# Patient Record
Sex: MALE | Race: WHITE | Employment: FULL TIME | URBAN - METROPOLITAN AREA
[De-identification: names, ages, dates, MRNs, and addresses within clinical notes are randomized per-mention and may not be internally consistent; named-entity substitution may affect disease eponyms.]

---

## 2021-02-22 ENCOUNTER — OFFICE VISIT (OUTPATIENT)
Dept: URGENT CARE | Facility: URGENT CARE | Age: 36
End: 2021-02-22
Payer: OTHER MISCELLANEOUS

## 2021-02-22 VITALS
DIASTOLIC BLOOD PRESSURE: 69 MMHG | OXYGEN SATURATION: 98 % | SYSTOLIC BLOOD PRESSURE: 126 MMHG | HEART RATE: 60 BPM | WEIGHT: 153 LBS | TEMPERATURE: 98.6 F

## 2021-02-22 DIAGNOSIS — S41.112A LACERATION OF LEFT UPPER EXTREMITY, INITIAL ENCOUNTER: Primary | ICD-10-CM

## 2021-02-22 DIAGNOSIS — T07.XXXA ABRASIONS OF MULTIPLE SITES: ICD-10-CM

## 2021-02-22 PROCEDURE — 12001 RPR S/N/AX/GEN/TRNK 2.5CM/<: CPT | Mod: 59 | Performed by: PHYSICIAN ASSISTANT

## 2021-02-22 PROCEDURE — 12032 INTMD RPR S/A/T/EXT 2.6-7.5: CPT | Performed by: PHYSICIAN ASSISTANT

## 2021-02-22 PROCEDURE — 99203 OFFICE O/P NEW LOW 30 MIN: CPT | Mod: 25 | Performed by: PHYSICIAN ASSISTANT

## 2021-02-22 RX ORDER — CEPHALEXIN 500 MG/1
500 CAPSULE ORAL 3 TIMES DAILY
Qty: 21 CAPSULE | Refills: 0 | Status: SHIPPED | OUTPATIENT
Start: 2021-02-22 | End: 2021-03-01

## 2021-02-23 NOTE — PATIENT INSTRUCTIONS
Patient Education     Laceration of an Arm or Leg: Stitches, Staples, or Tape   A laceration is a cut through the skin. If it's deep or it's gaping open, it may require stitches or staples to close so it can heal. Minor cuts may be treated with surgical tape closures, or skin glue.   X-rays may be done if something may have entered the skin through the cut. You may also need a tetanus shot if you are not up to date on this vaccine.   Home care    Follow the healthcare provider s instructions on how to care for the cut.    Wash your hands with soap and clean, running water before and after caring for your wound. This is to help prevent infection.    Keep the wound clean and dry. If a bandage was applied and it becomes wet or dirty, replace it. Otherwise, leave it in place for the first 24 hours, then change it once a day or as directed.    If stitches or staples were used, clean the wound daily:  ? After removing the bandage, wash the area with soap and water. Use a wet cotton swab to loosen and remove any blood or crust that forms.  ? After cleaning, keep the wound clean and dry. Talk with your healthcare provider before putting any antibiotic ointment on the wound. Reapply the bandage.    Remove the bandage to shower as usual after the first 24 hours, but don't soak the area in water (no swimming) until the stitches or staples are removed.    If surgical tape closures were used, keep the area clean and dry. If it becomes wet, blot it dry with a towel. Let the surgical tape fall off on its own.    Follow the healthcare provider's instructions for any medicines prescribed.  ? The provider may prescribe an antibiotic cream or ointment to prevent infection. He or she may also prescribe an antibiotic pill. Don't stop taking this medicine until you have finished it all or the provider tells you to stop.  ? The provider may also prescribe medicine for pain. Follow the instructions exactly for how to take these  medicines.    Don't do activities that may reopen your wound.    Follow-up care  Follow up with your healthcare provider, or as advised. Most skin wounds heal within 10 days. But an infection may sometimes occur even with proper treatment. Check the wound daily for the signs of infection listed below. Stitches and staples should be removed within 7 to14 days. If surgical tape closures were used, you may remove them after 10 days if they have not fallen off by then.    When to seek medical advice  Call your healthcare provider right away if any of these occur:    Wound bleeding not controlled by direct pressure    Signs of infection, including increasing pain in the wound, increasing wound redness or swelling, or pus or bad odor coming from the wound    Chills, fever of 100.4 F (38 C) or higher, or as directed by your healthcare provider    Stitches or staples coming apart or falling out or surgical tape falling off before 7 days    Wound edges reopening    Color changes in the wound    Numbness around the wound     Decreased movement around the injured area  [employer] last reviewed this educational content on 6/1/2020 2000-2020 [employer]. [address] [place] [address], [place] [address]. All rights reserved. This information is not intended as a substitute for professional medical care. Always follow your healthcare professional's instructions.

## 2021-02-23 NOTE — PROGRESS NOTES
Chief Complaint   Patient presents with     Urgent Care     Laceration     right forearm, left hand and upper arm       ASSESSMENT/PLAN:  Bhavesh was seen today for urgent care and laceration.    Diagnoses and all orders for this visit:    Laceration of left upper extremity, initial encounter  -     cephALEXin (KEFLEX) 500 MG capsule; Take 1 capsule (500 mg) by mouth 3 times daily for 7 days    Abrasions of multiple sites      Multiple abrasions and lacerations that were repaired today.  Procedure noticed well.  No evidence of concussion.  Discussed in detail wound care and return in 10 days for suture removal.  Will start on prophylactic antibiotics given proximity to muscle tissue and due to the object that caused his injury.  Does not know when his last tetanus shot the left before we did administer the vaccine.  Will call patient to come back to receive that      2 wounds closed today.   5 cm stellite wound over L bicep: Wound cleansed with cleanse soak.  Anesthesia: 5 cc of 2% lidocaine with epinephrine.  Injection site cleaned with iodine swab and alcohol.  Was for anesthesia was obtained the wound was further cleansed and explored for foreign body which there was none.  Thoroughly assessed for muscle involvement and weakness which there was none of.  4 oh Ethilon was used.  Horizontal mattress suture was used to bring tissue edges and close stellate area over the middle aspect.  Then 7 simple interrupted sutures were used to approximate wound edges with good aesthetic outcome.  Steri-Strips were placed over wound as well.  Care was taken to examine muscle strength throughout the procedure along with evidence for suturing underlying muscle tissue for which there was none.    1 cm full-thickness laceration of proximal forearm was anesthetized similarly as above and closed with 1 simple interrupted suture of 4-0 Ethilon and Steri-Strips placed over it    The rest of the abrasions were cleaned and bandaged    60  minutes spent on the date of the encounter doing chart review, history and exam, documentation and further activities as noted above    The plan of care was discussed with the patient. They understand and agree with the course of treatment prescribed. A printed summary was given including instructions and medications.    SUBJECTIVE:  Bhavesh is a 35 year old male who presents to urgent care with multiple abrasions over his extremities.  Patient was taking light bulb shattered and a glass succulent container fell on him and also shattered.  It went up of the head and cut multiple sites on his extremities.  Patient denies any loss of consciousness, nausea, vomiting.     ROS: Pertinent ROS neg other than the symptoms noted above in the HPI.     OBJECTIVE:  /69 (BP Location: Right arm, Patient Position: Sitting, Cuff Size: Adult Regular)   Pulse 60   Temp 98.6  F (37  C) (Oral)   Wt 69.4 kg (153 lb)   SpO2 98%    GENERAL: healthy, alert and no distress  HENT: Nondiscolored hematoma over left forehead  MS: No left bicep or upper extremity weakness.  Skin: Multiple superficial abrasions over patient's right hand and forearm.  Left bicep there is a 5 cm full-thickness laceration that does not involve the underlying muscle tissue.  1 cm full-thickness proximal forearm laceration.  1 cm partial-thickness laceration at base of thumb.  Nuero: No loss of sensation and neurovascular intact of the upper extremities    DIAGNOSTICS    No results found for any visits on 02/22/21.     No current outpatient medications on file.     No current facility-administered medications for this visit.       There is no problem list on file for this patient.     History reviewed. No pertinent past medical history.  History reviewed. No pertinent surgical history.  History reviewed. No pertinent family history.  Social History     Tobacco Use     Smoking status: Current Every Day Smoker     Smokeless tobacco: Never Used   Substance Use  Topics     Alcohol use: Not on file      Patient was seen in conjunction with Janett Ruano, NP Student.    Taco Acosta PA-C    The use of Dragon/[employer]ompton dictation services may have been used to construct the content in this note; any grammatical or spelling errors are non-intentional. Please contact the author of this note directly if you are in need of any clarification.

## 2021-02-24 ENCOUNTER — TELEPHONE (OUTPATIENT)
Dept: URGENT CARE | Facility: URGENT CARE | Age: 36
End: 2021-02-24

## 2021-02-25 ENCOUNTER — APPOINTMENT (OUTPATIENT)
Dept: INTERPRETER SERVICES | Facility: CLINIC | Age: 36
End: 2021-02-25

## 2021-02-25 NOTE — TELEPHONE ENCOUNTER
Called patient and LVM to come to [facility] and get a tetanus shot. Janett Ruano NP student, translated.    Please call patient again tomorrow and try and get a hold of him to get a tetanus shot

## 2021-02-25 NOTE — TELEPHONE ENCOUNTER
Patient called back and is reported that he did get a tetanus shot at [facility] last month.    Did pull from [facility] and the patient did get a tetanus shot on 1/8/2021.    Please call the patient back if he still needs to return to have another tetanus shot.     It is OK to leave a detailed message with a  at [phone].     Fabiana Clifford RN  [facility] Nurse Advisor  12:54 PM  2/25/2021

## 2021-02-25 NOTE — TELEPHONE ENCOUNTER
Called the no.below and gave the information that no further tetanus shot is necessary.  He verbalizes understanding and repeated back to me.    Kamala Edge RN on 2/25/2021 at 3:25 PM

## 2021-02-27 ENCOUNTER — HOSPITAL ENCOUNTER (EMERGENCY)
Facility: CLINIC | Age: 36
Discharge: HOME | End: 2021-02-28
Attending: EMERGENCY MEDICINE | Admitting: EMERGENCY MEDICINE
Payer: OTHER MISCELLANEOUS

## 2021-02-27 DIAGNOSIS — S41.112D: ICD-10-CM

## 2021-02-27 DIAGNOSIS — R20.2 NUMBNESS AND TINGLING IN LEFT ARM: ICD-10-CM

## 2021-02-27 DIAGNOSIS — R20.0 NUMBNESS AND TINGLING IN LEFT ARM: ICD-10-CM

## 2021-02-27 PROCEDURE — 99283 EMERGENCY DEPT VISIT LOW MDM: CPT

## 2021-02-28 ENCOUNTER — APPOINTMENT (OUTPATIENT)
Dept: GENERAL RADIOLOGY | Facility: CLINIC | Age: 36
End: 2021-02-28
Attending: EMERGENCY MEDICINE
Payer: OTHER MISCELLANEOUS

## 2021-02-28 VITALS
HEART RATE: 70 BPM | RESPIRATION RATE: 20 BRPM | OXYGEN SATURATION: 98 % | DIASTOLIC BLOOD PRESSURE: 62 MMHG | TEMPERATURE: 98 F | SYSTOLIC BLOOD PRESSURE: 120 MMHG

## 2021-02-28 PROCEDURE — 73060 X-RAY EXAM OF HUMERUS: CPT | Mod: LT

## 2021-02-28 ASSESSMENT — ENCOUNTER SYMPTOMS
WOUND: 1
FEVER: 0
NECK PAIN: 0
WEAKNESS: 0
COLOR CHANGE: 1
NUMBNESS: 1

## 2021-02-28 NOTE — DISCHARGE INSTRUCTIONS
Your arm has good blood flow, and I do not suspect injury to the nerves or the blood vessels based on your cut.  I suspect you are feeling the effects of mild swelling in your bicep.  I recommend heat pack for comfort, Tylenol and ibuprofen and follow-up with your primary doctor to have your stitches removed.  You should return to the emergency department should you have persistent discoloration of your arm, redness around your stitches develop fever or drainage from your cut.

## 2021-02-28 NOTE — ED PROVIDER NOTES
History   Chief Complaint:  Wound Check       HPI   The patient's ED visit was completed with the assistance of a . #[id number]  Bhavesh Reyes is a 35 year old male who presents for a wound check.  The patient reports he was getting a large glass planter from a high shelf at work 6 days ago when it slipped, landing on his head and then shattered.  He did not lose consciousness or sustain any scalp laceration but the glass shards fell onto his upper extremities.  He sustained lacerations to his left biceps, left forearm, and at the base of his right thumb.  The biceps and forearm wounds were repaired at urgent care.  No foreign body was seen although no x-ray was done.  He presents today with concerns that his left arm was cooler than his right yesterday and also looked somewhat purplish.  He has brief poking-like sensations in his left forearm and a pulling sensation with certain movement of his hand.  He also sometimes has numbness in his arm.  He denies any neck pain and can move his arm normally.  There is no drainage from the wounds or surrounding redness.      Review of Systems   Constitutional: Negative for fever.   Musculoskeletal: Negative for neck pain.        Positive for arm pain.   Skin: Positive for color change and wound.   Neurological: Positive for numbness. Negative for weakness.   All other systems reviewed and are negative.    Allergies:  No known drug allergies.     Medications:  Keflex    Past Medical History:    No significant past medical history    Social History:  Presents to the ED alone.  Work related injury    Physical Exam     Patient Vitals for the past 24 hrs:   BP Temp Temp src Pulse Resp SpO2   02/28/21 0140 120/62 -- -- 70 20 98 %   02/27/21 2351 124/61 98  F (36.7  C) Oral 68 18 99 %       Physical Exam  Constitutional: Alert, attentive, GCS 15  HENT:    Nose: Nose normal.    Mouth/Throat: Oropharynx is clear, mucous membranes are moist.  Eyes: EOM are normal,  anicteric, conjugate gaze  Neck: No tenderness, negative Spurling's test  CV: Bilateral symmetric radial pulses, cap refill <2 second in all 5 digits  Chest: Effort normal   MSK: Sutures on left bicep are not significantly tight, there is no redness swelling or drainage from the wound, muscle strength 5 out of 5.  Neurological: Alert, attentive, moving all extremities equally,  strength 5/5, sensation intact distally  Skin: Skin is warm and dry.     Emergency Department Course     Imaging:  Humerus x-ray, left (2 views):  Within normal limits. No fracture.  Report per radiology.     Emergency Department Course:  Reviewed:  I reviewed nursing notes, vitals and past medical history    Assessments:  (0031) I obtained history and examined the patient as noted above.   (0130) I rechecked the patient and explained findings.     Disposition:  The patient was discharged to home.     Impression & Plan     Medical Decision Making:  He is a 35 year old gentleman with no significant past medical history presenting for evaluation of his tingling and discoloration of his left arm in the setting of a biceps laceration sustained at work 6 days prior.  He reports migratory tingling and numbness in his left arm associated with it feeling cold the day prior.  However on exam today he has normal sensation, good blood flow, and I do not suspect vascular compromise or nerve injury.  He did have a pot land on his head however has no neck pain, no discomfort with range of motion of his neck.  X-rays here were negative for foreign body and he has no evidence of infection on exam.  I recommended continued conservative management with Tylenol, Ibuprofen, and heat pack for comfort.  I recommended follow up with his PCP for suture removal as planned.       Diagnosis:    ICD-10-CM    1. Numbness and tingling in left arm  R20.0     R20.2    2. Laceration of left arm with complication, subsequent encounter  S41.112D      Crispin Deluca,  MD  [employer]  4:09 AM 02/28/21     Scribe Disclosure:  I, Skylar Mortensen, am serving as a scribe at 12:31 AM on 2/28/2021 to document services personally performed by Crispin Deluca MD based on my observations and the provider's statements to me.           Crispin Deluca MD  02/28/21 0410

## 2021-03-04 ENCOUNTER — ALLIED HEALTH/NURSE VISIT (OUTPATIENT)
Dept: NURSING | Facility: CLINIC | Age: 36
End: 2021-03-04
Payer: OTHER MISCELLANEOUS

## 2021-03-04 DIAGNOSIS — Z48.02 ENCOUNTER FOR REMOVAL OF SUTURES: Primary | ICD-10-CM

## 2021-03-04 PROCEDURE — 99207 PR NO CHARGE NURSE ONLY: CPT

## 2021-03-04 NOTE — NURSING NOTE
Bhavesh Reyes presents to the clinic for removal of sutures and sutures,staples, steri strips. The patient has had sutures in place for 10 days. There has been no patient reported signs or symptoms of infection or drainage. 7  sutures and sutures,staples, staple, steri strips are seen and located on the left arm. All sutures and sutures,staples, steri strips were easily removed today. Routine wound care discussed by the RN or provider. The patient will follow up as needed.    Tamiko Lucero RN on 3/4/2021 at 12:30 PM

## 2021-03-05 ENCOUNTER — OFFICE VISIT (OUTPATIENT)
Dept: FAMILY MEDICINE | Facility: CLINIC | Age: 36
End: 2021-03-05
Payer: OTHER MISCELLANEOUS

## 2021-03-05 VITALS
BODY MASS INDEX: 20.89 KG/M2 | WEIGHT: 130 LBS | DIASTOLIC BLOOD PRESSURE: 64 MMHG | TEMPERATURE: 99.1 F | OXYGEN SATURATION: 99 % | HEART RATE: 76 BPM | SYSTOLIC BLOOD PRESSURE: 111 MMHG | HEIGHT: 66 IN

## 2021-03-05 DIAGNOSIS — Z02.6 ENCOUNTER RELATED TO WORKER'S COMPENSATION CLAIM: ICD-10-CM

## 2021-03-05 DIAGNOSIS — S49.92XS LEFT UPPER ARM INJURY, SEQUELA: Primary | ICD-10-CM

## 2021-03-05 PROCEDURE — 99213 OFFICE O/P EST LOW 20 MIN: CPT | Performed by: INTERNAL MEDICINE

## 2021-03-05 SDOH — HEALTH STABILITY: MENTAL HEALTH: HOW OFTEN DO YOU HAVE 6 OR MORE DRINKS ON ONE OCCASION?: NOT ASKED

## 2021-03-05 SDOH — HEALTH STABILITY: MENTAL HEALTH: HOW OFTEN DO YOU HAVE A DRINK CONTAINING ALCOHOL?: NOT ASKED

## 2021-03-05 SDOH — HEALTH STABILITY: MENTAL HEALTH: HOW MANY STANDARD DRINKS CONTAINING ALCOHOL DO YOU HAVE ON A TYPICAL DAY?: NOT ASKED

## 2021-03-05 ASSESSMENT — MIFFLIN-ST. JEOR: SCORE: 1472.43

## 2021-03-05 NOTE — PROGRESS NOTES
Assessment & Plan   Problem List Items Addressed This Visit     None      Visit Diagnoses     Left upper arm injury, sequela    -  Primary    Relevant Orders    OCCUPATIONAL THERAPY REFERRAL    Encounter related to worker's compensation claim             Patient complaining of some sensory changes including some occasional tingling numbness, feeling of cold sensation on the left arm, and some purplish discoloration of his left upper extremity.  Advised to continue to closely monitor as outpatient, the wound is healing appropriately there is no signs of infection.  Intact neuro vascular exam of the left upper extremity.  Refer to occupational therapy for evaluation regarding his return to work if any restrictions..  If any worsening of symptoms including discoloration of the left upper extremity/digits any persistent sensory abnormality such as tingling or numbness to follow-up for reevaluation.  Today's exam shows intact neurovascular exam and no concerns.  Reassurance given.  All this visit was done through through the   Reflux sympathetic dystrophy [RDS] is a possible consideration given the onset of symptoms after an injury to the left upper extremity.  We will continue to monitor closely as outpatient at the same time reassurance was given to the patient that seemed to help calm him down as he appeared concerned or apprehensive of the situation..  Advised to continue to avoid any strenuous activity using his left arm for now or any heavy weightlifting, which he states he does not do usually at his work, he has already returned to work without doing much physical activities.  Advised to keep his left arm elevated as possible such as using a pillow or resting his arm on a pillow when sitting or sleeping.  If symptoms progress or worsen follow-up immediately for evaluation; at this time there is no need for strong analgesics or any topical treatment.  The wound is healing appropriately, there is no  "signs or symptoms of infection or cellulitis or any neurologic or vascular deficit on exam apart from the symptoms he complains of.       Tobacco Cessation:   reports that he has been smoking. He has never used smokeless tobacco.      CONSULTATION/REFERRAL to Occupational Therapy  See Patient Instructions    No follow-ups on file.    Gamaliel Mcleod MD  [facility] MANJIT Ospina is a 35 year old who presents for the following health issues     HPI       ED/UC Followup:    Facility:  [facility] Emergency Dept    Date of visit: 02/27/2021   Reason for visit: Numbness and tingling in left arm  Laceration of left arm with complication, subsequent encounter    Current Status: Improved        Patient presenting for follow-up on left arm injury.  He reports when asked that this is happened at work and would be considered as a Workmen's Comp.  Patient had a laceration of the left proximal arm over the biceps anterior aspect.  Patient had an x-ray showed no foreign object.  Patient describes episodes of discoloration changes of his left arm some purplish blotching some cold sensation as well as some occasional tingling tingling numbness sensation.  Denies any weakness per se.  He was evaluated in the emergency room sutures were removed recently and he was reassured.  Patient denies any neck pain no shoulder injury.  Denies any left arm swelling.  No other body site injury.  Patient describe that piece of glass fell on his left arm and caused the laceration.  The laceration was deep full-thickness skin laceration and has been healing appropriately.    Review of Systems   Constitutional, HEENT, cardiovascular, pulmonary, gi and gu systems are negative, except as otherwise noted.      Objective    /64 (BP Location: Right arm, Patient Position: Chair, Cuff Size: Adult Large)   Pulse 76   Temp 99.1  F (37.3  C) (Temporal)   Ht 1.676 m (5' 6\")   Wt 59 kg (130 lb)   SpO2 99%  "  BMI 20.98 kg/m    Body mass index is 20.98 kg/m .  Physical Exam   GENERAL: healthy, alert and no distress  EYES: Eyes grossly normal to inspection, PERRL and conjunctivae and sclerae normal  NECK: no adenopathy, no asymmetry, masses, or scars and thyroid normal to palpation.  Full range of motion of the neck.  No tenderness over the cervical vertebral or paracervical area  RESP: lungs clear to auscultation - no rales, rhonchi or wheezes  CV: regular rate and rhythm, normal S1 S2, no S3 or S4, no murmur, click or rub, no peripheral edema and peripheral pulses strong  MS: no gross musculoskeletal defects noted, no edema.    Full range of motion of the left shoulder and left arm with flexion extension abduction adduction lifting arm above head.  No weakness appreciated.  SKIN: no suspicious lesions or rashes.Incision over the anterior proximal arm over the biceps muscle seems to be healing very well.  No surrounding erythema.  No subcutaneous collection.  No tenderness around the wound.  Wound appears healing very appropriately.   NEURO: Normal strength and tone, mentation intact and speech normal.  No weakness appreciated of left upper extremity.  Symmetric rise.  Intact motor power proximal distal muscles of improved extremity.  Intact sensation to monofilament test.  PSYCH: mentation appears normal, affect normal/bright    No results found for any previous visit.

## 2021-03-22 ENCOUNTER — APPOINTMENT (OUTPATIENT)
Dept: INTERPRETER SERVICES | Facility: CLINIC | Age: 36
End: 2021-03-22

## 2021-07-15 PROCEDURE — 88184 FLOWCYTOMETRY/ TC 1 MARKER: CPT

## 2021-07-15 PROCEDURE — 88185 FLOWCYTOMETRY/TC ADD-ON: CPT

## 2021-07-16 ENCOUNTER — LAB REQUISITION (OUTPATIENT)
Dept: LAB | Facility: CLINIC | Age: 36
End: 2021-07-16

## 2021-07-19 LAB
PATH REPORT.COMMENTS IMP SPEC: NORMAL
PATH REPORT.FINAL DX SPEC: NORMAL
PATH REPORT.MICROSCOPIC SPEC OTHER STN: NORMAL
PATH REPORT.RELEVANT HX SPEC: NORMAL

## 2025-07-08 NOTE — ED NOTES
Assumed care of patient. Patient on cardiac monitor and resting in a position of comfort. Sitter at bedside   Pt presents with complaints of L arm pain with decreased sensation and pallor. Pt states on Monday he had a vase drop onto his head, glass cut his arm and he had stitches placed. Pt has sutures present to L bicep area and reports pain at the site and pain/decreased sensation since and worsening over the past 2 days. Pt states he never had an xray performed to look for foreign bodies. The skin is bruised and taunt at the incision site. Reports decreased sensation to touch and the L arm is more dusky than the R. Strong radial and brachial pulse present